# Patient Record
Sex: FEMALE | Race: OTHER | URBAN - METROPOLITAN AREA
[De-identification: names, ages, dates, MRNs, and addresses within clinical notes are randomized per-mention and may not be internally consistent; named-entity substitution may affect disease eponyms.]

---

## 2024-03-09 ENCOUNTER — EMERGENCY (EMERGENCY)
Facility: HOSPITAL | Age: 54
LOS: 0 days | Discharge: HOME | End: 2024-03-09
Attending: STUDENT IN AN ORGANIZED HEALTH CARE EDUCATION/TRAINING PROGRAM
Payer: COMMERCIAL

## 2024-03-09 VITALS
HEART RATE: 83 BPM | OXYGEN SATURATION: 100 % | TEMPERATURE: 97 F | HEIGHT: 60 IN | RESPIRATION RATE: 17 BRPM | DIASTOLIC BLOOD PRESSURE: 85 MMHG | WEIGHT: 164.91 LBS | SYSTOLIC BLOOD PRESSURE: 139 MMHG

## 2024-03-09 VITALS
RESPIRATION RATE: 18 BRPM | OXYGEN SATURATION: 98 % | HEART RATE: 66 BPM | SYSTOLIC BLOOD PRESSURE: 123 MMHG | DIASTOLIC BLOOD PRESSURE: 65 MMHG | TEMPERATURE: 98 F

## 2024-03-09 DIAGNOSIS — M25.532 PAIN IN LEFT WRIST: ICD-10-CM

## 2024-03-09 DIAGNOSIS — M25.531 PAIN IN RIGHT WRIST: ICD-10-CM
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
## 2024-03-09 PROCEDURE — 70450 CT HEAD/BRAIN W/O DYE: CPT | Mod: 26,MC

## 2024-03-09 PROCEDURE — 72125 CT NECK SPINE W/O DYE: CPT | Mod: 26,MC

## 2024-03-09 PROCEDURE — 99285 EMERGENCY DEPT VISIT HI MDM: CPT

## 2024-03-09 PROCEDURE — 73110 X-RAY EXAM OF WRIST: CPT | Mod: 26,50

## 2024-03-09 PROCEDURE — 73560 X-RAY EXAM OF KNEE 1 OR 2: CPT | Mod: 26,LT

## 2024-03-09 RX ORDER — ACETAMINOPHEN 500 MG
2 TABLET ORAL
Qty: 30 | Refills: 0
Start: 2024-03-09 | End: 2024-03-13

## 2024-03-09 RX ORDER — LIDOCAINE 4 G/100G
1 CREAM TOPICAL
Qty: 2 | Refills: 0
Start: 2024-03-09 | End: 2024-03-13

## 2024-03-09 RX ORDER — KETOROLAC TROMETHAMINE 30 MG/ML
15 SYRINGE (ML) INJECTION ONCE
Refills: 0 | Status: DISCONTINUED | OUTPATIENT
Start: 2024-03-09 | End: 2024-03-09

## 2024-03-09 RX ORDER — LIDOCAINE 4 G/100G
1 CREAM TOPICAL ONCE
Refills: 0 | Status: COMPLETED | OUTPATIENT
Start: 2024-03-09 | End: 2024-03-09

## 2024-03-09 RX ORDER — IBUPROFEN 200 MG
1 TABLET ORAL
Qty: 15 | Refills: 0
Start: 2024-03-09 | End: 2024-03-13

## 2024-03-09 RX ADMIN — Medication 15 MILLIGRAM(S): at 22:30

## 2024-03-09 RX ADMIN — LIDOCAINE 1 PATCH: 4 CREAM TOPICAL at 22:30

## 2024-03-09 NOTE — ED PROVIDER NOTE - WET READ LAUNCH FT
[facility]    [address] [address]    Phone:  [phone]       Thank You for choosing us for your health care visit. We are glad to serve you and happy to provide you with this summary of your visit. Please help us to ensure we have accurate records. If you find anything that needs to be changed, please let our staff know as soon as possible.          Your Demographic Information     Patient Name Sex Eduarda Nix Female 1997       Ethnic Group Patient Race    Not of  or  Origin White      Your Visit Details     Date & Time Provider Department    2017 3:30 PM Michele Silveira MD [facility]      Your Upcoming Appointment*(Max 10)     Friday May 12, 2017  3:30 PM CDT   Obstetric Check with Michele Silveira MD   [facility] ([facility])    [address] [place] [address] [phone]            Wednesday May 17, 2017  2:10 PM CDT   Obstetric Check with Oc Cardona MD   [facility] ([facility])    [address] [place] [address] [phone]              Your Vitals Were     BP Pulse Resp Height Weight LMP    122/62 87 14 5' 3\" (1.6 m) 132 lb 4.4 oz (60 kg) 2016 (Exact Date)    BMI Smoking Status                23.43 kg/m2 Former Smoker          Medications Prescribed or Re-Ordered Today     None      Your Current Medications Are        Disp Refills Start End    Multiple Vitamins-Minerals (MULTIVITAMIN ADULT) Chew Tab        Class: Historical Med    Route: Oral    buPROPion (WELLBUTRIN XL) 150 MG 24 hr tablet 30 tablet 4 2017     Sig - Route: Take 1 tablet by mouth daily. - Oral    Class: Eprescribe    Famotidine (PEPCID PO)        Sig - Route: Take 1 tablet by mouth 2 times daily. - Oral    Class: Historical Med    ondansetron (ZOFRAN  ODT) 4 MG disintegrating tablet 30 tablet 1 2017     Sig - Route: Take 1 tablet by mouth every 8 hours as needed for Nausea. - Oral    Class: Eprescribe    hydrOXYzine (ATARAX) 25 MG tablet 30 tablet 1 2017     Sig - Route: Take 1 tablet by mouth nightly as needed for Anxiety. - Oral    Class: Eprescribe    Prochlorperazine Maleate (COMPAZINE PO)        Sig - Route: Take 2 tablets by mouth as needed. - Oral    Class: Historical Med    Prenatal Multivit-Min-Fe-FA (PRENATAL VITAMINS) 0.8 MG Tab 30 tablet 11 10/4/2016     Si tab daily    Class: Eprescribe    colestipol (COLESTID) 1 G Tab 60 tablet 4 2016     Sig: Take 1 tab twice daily    Class: Eprescribe      Allergies     Morphine Nausea & Vomiting      Immunizations History as of 2017     Name Date    Tdap 2017, 2008      Problem List as of 2017     Hyperemesis arising during pregnancy    Encounter for supervision of normal first pregnancy in first trimester    Marijuana use    Dumping syndrome    Mononucleosis    Nausea/vomiting in pregnancy            Patient Instructions     None       There are no Wet Read(s) to document.

## 2024-03-09 NOTE — ED PROVIDER NOTE - PHYSICAL EXAMINATION
General: Well appearing female in no acute distress  HEENT: Normocephalic, atraumatic. Moist mucous membranes. Oropharynx clear. No lymphadenopathy.  Eyes: No scleral icterus. EOMI. JUANA.  Neck:. Soft and supple. Full ROM without pain. No midline tenderness  Cardiac: Regular rate and regular rhythm. No murmurs, rubs, gallops. Peripheral pulses 2+ and symmetric. No LE edema.  Resp: Lungs CTAB. Speaking in full sentences. No wheezes, rales or rhonchi.  Abd: Soft, non-tender, non-distended. No guarding or rebound. No scars, masses, or lesions.  Back: Spine midline and non-tender. No CVA tenderness.    Skin: No rashes, abrasions, or lacerations.  Neuro: AO x 3. Moves all extremities symmetrically. Motor strength and sensation grossly intact.

## 2024-03-09 NOTE — ED PROVIDER NOTE - CARE PROVIDER_API CALL
Pete Perkins  Orthopaedic Surgery  [address], [address]  [place] [address]  Phone: [phone]  Fax: [phone]  Follow Up Time:     Anders Hassan  Orthopaedic Surgery  [address]  [place] [address]  Phone: [phone]  Fax: [phone]  Follow Up Time:

## 2024-03-09 NOTE — ED PROVIDER NOTE - OBJECTIVE STATEMENT
54 F presenting to the ED after a slip and fall while at [employer]. Pt states that she slipped from a puddle. She denies LOC but states that she hit the back of her head

## 2024-03-09 NOTE — ED ADULT NURSE REASSESSMENT NOTE - NS ED NURSE REASSESS COMMENT FT1
pt returned from imaging. able to walk to bathroom, safely returned back to chair in NAD, RR even and unlabored. pt medicated as per orders, tolerated well. awaiting imaging results

## 2024-03-09 NOTE — ED PROVIDER NOTE - PATIENT PORTAL LINK FT
You can access the FollowMyHealth Patient Portal offered by [facility] by registering at the following website: [email or web address]. By joining [employer] ([employer]’s FollowMyHealth portal, you will also be able to view your health information using other applications (apps) compatible with our system.

## 2024-03-09 NOTE — ED PROVIDER NOTE - NS ED ROS FT
General: Denies fever, chills  HEENT: Denies sensory changes, sore throat  Neck: Denies neck pain, neck stiffness  Resp: Denies coughing, SOB  Cardiovascular: Denies CP, palpitations, LE edema  GI: Denies nausea, vomiting, abdominal pain, diarrhea, constipation, blood in stool  : Denies dysuria, hematuria, frequency, incontinence  MSK: Denies back pain; + wrist pain  Neuro: headache  Skin: Denies rashes.

## 2024-03-09 NOTE — ED ADULT TRIAGE NOTE - CHIEF COMPLAINT QUOTE
fall at home depo in a wet floor , c/o left knee pain , right wrist pain , hit her head and neck ,no loc ,no meds, c collar applied.

## 2024-03-09 NOTE — ED PROVIDER NOTE - NSFOLLOWUPINSTRUCTIONS_ED_ALL_ED_FT
There were no fractures on imaging. Please take ibuprofen and tylenol for pain There were no fractures on imaging. Please take ibuprofen and tylenol for pain    See the orthopedic doctor if you have continued pain

## 2024-03-09 NOTE — ED ADULT NURSE NOTE - NSFALLUNIVINTERV_ED_ALL_ED
Bed/Stretcher in lowest position, wheels locked, appropriate side rails in place/Call bell, personal items and telephone in reach/Instruct patient to call for assistance before getting out of bed/chair/stretcher/Non-slip footwear applied when patient is off stretcher/[place] to call system/Physically safe environment - no spills, clutter or unnecessary equipment/Purposeful proactive rounding/Room/bathroom lighting operational, light cord in reach

## 2024-03-09 NOTE — ED ADULT NURSE NOTE - OBJECTIVE STATEMENT
pt had mechanical fall at home d/t wet floor. + head strike, denies blood thinners. + head and neck pain, + left knee and right wrist pain. skin warm dry and intact. denies taking pain meds pta. pt A&OX4, able to speak in clear complete sentences, CHOWDHURY equal bilaterally. c-collar removed by MD. pt to ct scan and xray via wheelchair, safety measures maintained pt had mechanical fall d/t wet floor. + head strike, denies blood thinners. + head and neck pain, + left knee and right wrist pain. skin warm dry and intact. denies taking pain meds pta. pt A&OX4, able to speak in clear complete sentences, CHOWDHURY equal bilaterally. c-collar removed by MD. pt to ct scan and xray via wheelchair, safety measures maintained